# Patient Record
Sex: MALE | Race: WHITE | Employment: OTHER | URBAN - METROPOLITAN AREA
[De-identification: names, ages, dates, MRNs, and addresses within clinical notes are randomized per-mention and may not be internally consistent; named-entity substitution may affect disease eponyms.]

---

## 2021-12-22 ENCOUNTER — HOSPITAL ENCOUNTER (OUTPATIENT)
Dept: GENERAL RADIOLOGY | Facility: HOSPITAL | Age: 66
Discharge: HOME | End: 2021-12-22
Attending: PHYSICAL MEDICINE & REHABILITATION
Payer: MEDICARE

## 2021-12-22 ENCOUNTER — OFFICE VISIT (OUTPATIENT)
Dept: PHYSICAL MEDICINE AND REHAB | Facility: CLINIC | Age: 66
End: 2021-12-22
Payer: MEDICARE

## 2021-12-22 VITALS
HEART RATE: 66 BPM | BODY MASS INDEX: 27.92 KG/M2 | SYSTOLIC BLOOD PRESSURE: 124 MMHG | WEIGHT: 195 LBS | OXYGEN SATURATION: 98 % | DIASTOLIC BLOOD PRESSURE: 62 MMHG | HEIGHT: 70 IN

## 2021-12-22 DIAGNOSIS — M25.561 CHRONIC PAIN OF RIGHT KNEE: ICD-10-CM

## 2021-12-22 DIAGNOSIS — M25.60 LIMITED JOINT RANGE OF MOTION: ICD-10-CM

## 2021-12-22 DIAGNOSIS — G89.29 CHRONIC PAIN OF RIGHT KNEE: ICD-10-CM

## 2021-12-22 DIAGNOSIS — G89.29 CHRONIC PAIN OF RIGHT KNEE: Primary | ICD-10-CM

## 2021-12-22 DIAGNOSIS — M25.561 CHRONIC PAIN OF RIGHT KNEE: Primary | ICD-10-CM

## 2021-12-22 PROBLEM — C43.9 MELANOMA OF SKIN (HCC): Status: ACTIVE | Noted: 2019-09-11

## 2021-12-22 PROCEDURE — 73502 X-RAY EXAM HIP UNI 2-3 VIEWS: CPT | Performed by: PHYSICAL MEDICINE & REHABILITATION

## 2021-12-22 PROCEDURE — 73562 X-RAY EXAM OF KNEE 3: CPT | Performed by: PHYSICAL MEDICINE & REHABILITATION

## 2021-12-22 PROCEDURE — 99204 OFFICE O/P NEW MOD 45 MIN: CPT | Performed by: PHYSICAL MEDICINE & REHABILITATION

## 2021-12-22 RX ORDER — MELOXICAM 15 MG/1
15 TABLET ORAL DAILY
Qty: 30 TABLET | Refills: 0 | Status: SHIPPED | OUTPATIENT
Start: 2021-12-22

## 2021-12-22 NOTE — PROGRESS NOTES
[address]  Progress Note    CHIEF COMPLAINT:  Patient presents with:  Knee Pain: New right hand patient c/o right knee pain. Running injury (May 2021).  Has seen a Chiropractor and did exercises with no reli Gastrointestinal  Bowel Incontinence: denies  Heartburn: denies  Abdominal Pain: denies  Blood in Stool : denies  Rectal Pain: denies   Hematology  Easy Bruising: denies  Easy Bleeding: denies   Genitourinary  Difficulty Urinating: denies  Bladder Incont reasonable. If there is heavy tibiofemoral arthritis, we may need to decrease his mileage and treat symptomatically. - XR KNEE (3 VIEWS), RIGHT (CPT=73562); Future    2.  Limited joint range of motion  Very likely due to left hip arthritis given his exami

## 2021-12-23 ENCOUNTER — TELEPHONE (OUTPATIENT)
Dept: PHYSICAL MEDICINE AND REHAB | Facility: CLINIC | Age: 66
End: 2021-12-23

## 2021-12-23 DIAGNOSIS — M22.2X1 PATELLOFEMORAL PAIN SYNDROME OF RIGHT KNEE: Primary | ICD-10-CM

## 2021-12-23 NOTE — TELEPHONE ENCOUNTER
I personally reviewed a plain film x-ray of the right knee showing reasonably preserved tibiofemoral joint spaces, there is slight patellofemoral joint space narrowing.     Please of the patient know that his x-rays show a left hip arthritis, mild and the r

## 2022-01-13 ENCOUNTER — PATIENT MESSAGE (OUTPATIENT)
Dept: PHYSICAL MEDICINE AND REHAB | Facility: CLINIC | Age: 67
End: 2022-01-13

## 2022-01-14 NOTE — TELEPHONE ENCOUNTER
Per telephone encounter of 12/23/2021:     I personally reviewed a plain film x-ray of the right knee showing reasonably preserved tibiofemoral joint spaces, there is slight patellofemoral joint space narrowing.     Please of the patient know that his x-ra

## 2022-01-14 NOTE — TELEPHONE ENCOUNTER
From: Edna Charles  To: Sujatha Engel MD  Sent: 1/13/2022 8:04 PM CST  Subject: knee injury    hi doctor- I just wanted to follow up to see if you had any thoughts about the x-rays from last month and a game plan for the knee.  I have picked up some

## 2022-01-15 NOTE — TELEPHONE ENCOUNTER
Left detailed message about X-ray results and the information for PT. To call the office to get the prescription if needed.

## 2022-01-18 ENCOUNTER — TELEPHONE (OUTPATIENT)
Dept: PHYSICAL MEDICINE AND REHAB | Facility: CLINIC | Age: 67
End: 2022-01-18

## 2022-01-18 NOTE — TELEPHONE ENCOUNTER
Patient called and said that his PT said that he needs a prescription written for PT. Please advise.

## 2022-01-25 ENCOUNTER — MED REC SCAN ONLY (OUTPATIENT)
Dept: PHYSICAL MEDICINE AND REHAB | Facility: CLINIC | Age: 67
End: 2022-01-25

## (undated) NOTE — LETTER
[address], [facility]   Date:   12/22/2021     Name:   Tom Gregory    YOB: 1955   MRN:   [id number]       WHERE IS YOUR PAIN NOW?   Haseeb the areas on your body where you feel the desc